# Patient Record
Sex: FEMALE | Race: WHITE | NOT HISPANIC OR LATINO
[De-identification: names, ages, dates, MRNs, and addresses within clinical notes are randomized per-mention and may not be internally consistent; named-entity substitution may affect disease eponyms.]

---

## 2021-07-09 ENCOUNTER — NON-APPOINTMENT (OUTPATIENT)
Age: 66
End: 2021-07-09

## 2021-07-09 ENCOUNTER — APPOINTMENT (OUTPATIENT)
Dept: OPHTHALMOLOGY | Facility: CLINIC | Age: 66
End: 2021-07-09
Payer: MEDICARE

## 2021-07-09 PROCEDURE — 92012 INTRM OPH EXAM EST PATIENT: CPT

## 2021-07-09 PROCEDURE — 76514 ECHO EXAM OF EYE THICKNESS: CPT

## 2021-07-16 ENCOUNTER — OUTPATIENT (OUTPATIENT)
Dept: OUTPATIENT SERVICES | Facility: HOSPITAL | Age: 66
LOS: 1 days | End: 2021-07-16
Payer: MEDICARE

## 2021-07-16 PROCEDURE — 93010 ELECTROCARDIOGRAM REPORT: CPT

## 2021-08-04 ENCOUNTER — INPATIENT (INPATIENT)
Facility: HOSPITAL | Age: 66
LOS: 1 days | Discharge: HOME HEALTH | End: 2021-08-06

## 2021-08-04 ENCOUNTER — OUTPATIENT (OUTPATIENT)
Dept: OUTPATIENT SERVICES | Facility: HOSPITAL | Age: 66
LOS: 1 days | End: 2021-08-04

## 2021-08-05 ENCOUNTER — OUTPATIENT (OUTPATIENT)
Dept: OUTPATIENT SERVICES | Facility: HOSPITAL | Age: 66
LOS: 1 days | End: 2021-08-05

## 2021-10-11 ENCOUNTER — APPOINTMENT (OUTPATIENT)
Dept: PLASTIC SURGERY | Facility: CLINIC | Age: 66
End: 2021-10-11
Payer: MEDICARE

## 2021-10-11 ENCOUNTER — NON-APPOINTMENT (OUTPATIENT)
Age: 66
End: 2021-10-11

## 2021-10-11 VITALS
HEIGHT: 63 IN | SYSTOLIC BLOOD PRESSURE: 130 MMHG | WEIGHT: 149 LBS | HEART RATE: 68 BPM | DIASTOLIC BLOOD PRESSURE: 90 MMHG | BODY MASS INDEX: 26.4 KG/M2 | TEMPERATURE: 98 F | RESPIRATION RATE: 14 BRPM | OXYGEN SATURATION: 100 %

## 2021-10-11 DIAGNOSIS — Z79.899 OTHER LONG TERM (CURRENT) DRUG THERAPY: ICD-10-CM

## 2021-10-11 DIAGNOSIS — Z98.890 OTHER SPECIFIED POSTPROCEDURAL STATES: ICD-10-CM

## 2021-10-11 DIAGNOSIS — S71.001A UNSPECIFIED OPEN WOUND, RIGHT HIP, INITIAL ENCOUNTER: ICD-10-CM

## 2021-10-11 DIAGNOSIS — E78.49 OTHER HYPERLIPIDEMIA: ICD-10-CM

## 2021-10-11 DIAGNOSIS — Z78.9 OTHER SPECIFIED HEALTH STATUS: ICD-10-CM

## 2021-10-11 DIAGNOSIS — Z82.49 FAMILY HISTORY OF ISCHEMIC HEART DISEASE AND OTHER DISEASES OF THE CIRCULATORY SYSTEM: ICD-10-CM

## 2021-10-11 DIAGNOSIS — Z80.0 FAMILY HISTORY OF MALIGNANT NEOPLASM OF DIGESTIVE ORGANS: ICD-10-CM

## 2021-10-11 DIAGNOSIS — F12.90 CANNABIS USE, UNSPECIFIED, UNCOMPLICATED: ICD-10-CM

## 2021-10-11 DIAGNOSIS — Z78.9 OTHER LONG TERM (CURRENT) DRUG THERAPY: ICD-10-CM

## 2021-10-11 DIAGNOSIS — Z80.3 FAMILY HISTORY OF MALIGNANT NEOPLASM OF BREAST: ICD-10-CM

## 2021-10-11 DIAGNOSIS — I10 ESSENTIAL (PRIMARY) HYPERTENSION: ICD-10-CM

## 2021-10-11 PROCEDURE — 99202 OFFICE O/P NEW SF 15 MIN: CPT

## 2021-10-11 PROCEDURE — 10061 I&D ABSCESS COMP/MULTIPLE: CPT

## 2021-10-11 RX ORDER — HYDROCHLOROTHIAZIDE 12.5 MG/1
12.5 CAPSULE ORAL DAILY
Refills: 0 | Status: ACTIVE | COMMUNITY

## 2021-10-11 RX ORDER — ENALAPRIL MALEATE 10 MG/1
10 TABLET ORAL
Qty: 90 | Refills: 0 | Status: ACTIVE | COMMUNITY
Start: 2021-09-21

## 2021-10-11 NOTE — HISTORY OF PRESENT ILLNESS
[FreeTextEntry1] : This is a 67 yo woman who underwent right hip replacement on 8/4/21.\par Postoperatively she has done well, gradually improving function and walking a mile and a half, but has had problems with the wound. She reports that it began to open up about 2 weeks after surgery and has had some areas of inflamation around it. She was started on a wound VAC by Dr. Alvarado and more recently has been using 2x2 gauze packing, overlying gauze and tape daily or BID. She has been showering and letting the water run over the area, but not scrubbing it.\par No fevers or chills. No hip pain (gradually improving since surgery).

## 2021-10-11 NOTE — REVIEW OF SYSTEMS
[As Noted in HPI] : as noted in HPI [Negative] : Respiratory [Fever] : no fever [Chills] : no chills [FreeTextEntry3] : Being treated for increased pressure, but not diagnosed with glaucoma and not symptomatic.

## 2021-10-11 NOTE — ASSESSMENT
[FreeTextEntry1] : Pt instructed to continue local wound care.\par We will call her to arrange for follow up next week.

## 2021-10-11 NOTE — PHYSICAL EXAM
[NI] : Normal [de-identified] : NL respiratory effort noted  [de-identified] : Right hip/right groin anterior hip replacement scar.\par There are three areas of concern within the scar. Superiorly, there is some erythema and surrounding induration. It is mildly tender and minimally fluctuant. Centrally, there is some erythema and surrounding induration with 0.5 x 3 cm fluctuant area deep to the scar. Inferiorly, there is an open wound, about 2x3 cm in maximum dimension and about 0.5cm deep. The base of the wound is made up of granulation tissue. The surrounding area is not as erythematous or indurated as the other two areas.\par There is no evidence of sinus track or deeper extension.

## 2021-10-11 NOTE — PROCEDURE
[Nl] : None [FreeTextEntry1] : Right hip open wound, right hip collection [FreeTextEntry2] : Incision and drainage of right hip wound [FreeTextEntry6] : The right hip wound was prepped with an alcohol swab and infiltrated with 1% lidocaine with epi in the periwound skin. Time was allowed to pass for vasoconstriction.\par A  1-2 cm incision was made with a 15 blade scalpel. in each area of fluctuance.  This was carried through the dermis into the subcutaneous tissue. \par A culture swab was taken in the central area.\par There was no gross purulence.\par There was indurated tissue consistent with a firm area of fat necrosis in both areas.\par Both were packed with the corner of a 4x4 gauze and dressed with bordered gauze.\par \par Pt instructed to continue local wound care.\par We will call her to arrange for follow up next week.\par

## 2021-10-20 ENCOUNTER — APPOINTMENT (OUTPATIENT)
Dept: PLASTIC SURGERY | Facility: CLINIC | Age: 66
End: 2021-10-20
Payer: MEDICARE

## 2021-10-20 VITALS
HEIGHT: 63 IN | SYSTOLIC BLOOD PRESSURE: 134 MMHG | HEART RATE: 68 BPM | TEMPERATURE: 97.9 F | DIASTOLIC BLOOD PRESSURE: 72 MMHG | BODY MASS INDEX: 26.22 KG/M2 | RESPIRATION RATE: 16 BRPM | OXYGEN SATURATION: 98 % | WEIGHT: 148 LBS

## 2021-10-20 PROCEDURE — 99213 OFFICE O/P EST LOW 20 MIN: CPT

## 2021-10-20 NOTE — HISTORY OF PRESENT ILLNESS
[FreeTextEntry1] : Pt reports she is just using a dry dressing on the area.\par Trying to change where the tape goes.\par Overall feeling better but still sore.

## 2021-10-20 NOTE — PHYSICAL EXAM
[NI] : Normal [de-identified] : NL respiratory effort noted  [de-identified] : Right hip wounds:\par The distal wound is closing nicely, about 1 x 0.3 cm now, but some dry skin and crust around the wound.\par The middle wound is very small, but has a droplet of fat necrosis coming out.\par The top wound is small with no purulence or fat necrosis.\par The surrounding tissues are much softer with no erythema and minimal induration.\par Tape marks seen bruce medially and superior to the wound.

## 2021-10-20 NOTE — REASON FOR VISIT
[Follow-Up: _____] : a [unfilled] follow-up visit [FreeTextEntry1] : the pt is here for a follow up for her right hip incision, states that she is doing better but still has some soreness

## 2021-11-03 ENCOUNTER — APPOINTMENT (OUTPATIENT)
Dept: PLASTIC SURGERY | Facility: CLINIC | Age: 66
End: 2021-11-03
Payer: MEDICARE

## 2021-11-03 VITALS
HEIGHT: 63 IN | WEIGHT: 150 LBS | BODY MASS INDEX: 26.58 KG/M2 | HEART RATE: 68 BPM | RESPIRATION RATE: 16 BRPM | DIASTOLIC BLOOD PRESSURE: 68 MMHG | OXYGEN SATURATION: 96 % | TEMPERATURE: 97.9 F | SYSTOLIC BLOOD PRESSURE: 132 MMHG

## 2021-11-03 PROCEDURE — 99212 OFFICE O/P EST SF 10 MIN: CPT

## 2021-11-03 NOTE — ASSESSMENT
[FreeTextEntry1] : Right hip wound, improving.\par I expect multiple areas to drain small amounts as the fat necrosis gradually clears.\par Continue to keep the area moist and not let it dry out as this will trap fat necrosis and potentially cause new abscesses.\par Follow up in 2-3 weeks.\par \par Pt asked about medical honey dressings. I would not advise using these at this time. When she is ready for scar care, silicone gel is recommended.

## 2021-11-03 NOTE — HISTORY OF PRESENT ILLNESS
[FreeTextEntry1] : Pt reports she has still been having some drainage, but small amounts.\par She is using the vaseline on gauze or Telfa with paper tape daily.\par The area is less sore.

## 2021-11-03 NOTE — PHYSICAL EXAM
[NI] : Normal [de-identified] : NL respiratory effort noted  [de-identified] : Right hip incision:\par The incision is mostly closed except for 5 areas that are about 3-5 mm pustules with some remaining fat necrosis.\par No surrounding erythema  and decreasing induration throughout.\par No sinus tracks.

## 2021-11-03 NOTE — REASON FOR VISIT
[Follow-Up: _____] : a [unfilled] follow-up visit [FreeTextEntry1] : here to follow up for right hip incision, states that the area is doing well and has no new complaints

## 2021-11-15 LAB — BACTERIA WND CULT: ABNORMAL

## 2021-11-18 ENCOUNTER — NON-APPOINTMENT (OUTPATIENT)
Age: 66
End: 2021-11-18

## 2021-11-24 ENCOUNTER — APPOINTMENT (OUTPATIENT)
Dept: PLASTIC SURGERY | Facility: CLINIC | Age: 66
End: 2021-11-24
Payer: MEDICARE

## 2021-11-24 VITALS
TEMPERATURE: 98.1 F | HEIGHT: 63 IN | HEART RATE: 59 BPM | BODY MASS INDEX: 25.87 KG/M2 | OXYGEN SATURATION: 96 % | SYSTOLIC BLOOD PRESSURE: 128 MMHG | WEIGHT: 146 LBS | DIASTOLIC BLOOD PRESSURE: 74 MMHG | RESPIRATION RATE: 16 BRPM

## 2021-11-24 PROCEDURE — 99213 OFFICE O/P EST LOW 20 MIN: CPT

## 2021-11-24 NOTE — PHYSICAL EXAM
[NI] : Normal [de-identified] : NL respiratory effort noted  [de-identified] : right hip scar with a few pinpoint areas open\par The majority of the wounds have healed and there is significant reduction in the surrounding induration.\par There is no erythema.\par There is no sinus track.

## 2021-11-24 NOTE — REASON FOR VISIT
[Follow-Up: _____] : a [unfilled] follow-up visit [FreeTextEntry1] : the pt is here to follow up for wound on her right hip, states that there are no new complaints and that she is doing well

## 2021-11-24 NOTE — ASSESSMENT
[FreeTextEntry1] : Doing well\par Continue wound care.\par Reassured pt that this is a slow process, but everything is improving with each visit.\par Once all of the necrotic fat is either processed by the body or pushed out through the skin, the scar will  completely close and start softening.\par Follow up in 3 weeks.

## 2021-11-24 NOTE — HISTORY OF PRESENT ILLNESS
[FreeTextEntry1] : Pt reports that she is continuing to use ointment and gauze daily.\par She does still have some hip pain.\par The "pimples" seem to go away, and then new areas open up.

## 2021-12-10 ENCOUNTER — APPOINTMENT (OUTPATIENT)
Dept: OPHTHALMOLOGY | Facility: CLINIC | Age: 66
End: 2021-12-10

## 2021-12-15 ENCOUNTER — APPOINTMENT (OUTPATIENT)
Dept: PLASTIC SURGERY | Facility: CLINIC | Age: 66
End: 2021-12-15
Payer: MEDICARE

## 2021-12-15 VITALS
SYSTOLIC BLOOD PRESSURE: 126 MMHG | DIASTOLIC BLOOD PRESSURE: 74 MMHG | BODY MASS INDEX: 26.22 KG/M2 | RESPIRATION RATE: 16 BRPM | WEIGHT: 148 LBS | HEART RATE: 68 BPM | OXYGEN SATURATION: 98 % | HEIGHT: 63 IN

## 2021-12-15 PROCEDURE — 99213 OFFICE O/P EST LOW 20 MIN: CPT

## 2021-12-16 NOTE — ASSESSMENT
[FreeTextEntry1] : Right hip wounds are getting smaller and are much less indurated/inflamed.\par Continue abx ointment or vaseline on the dressing.\par I advised avoiding tape as much as possible as the tape is starting to cause some skin trauma.\par Use tights, "boy shorts", Spanx or bike shorts to hold gauze in place instead.\par Follow up one month

## 2021-12-16 NOTE — PHYSICAL EXAM
[NI] : Normal [de-identified] : NL respiratory effort noted  [de-identified] : right hip scar with a few pinpoint areas open\par The majority of the wounds have healed and there is significant reduction in the surrounding induration.\par There is no erythema.\par There is no sinus track.

## 2021-12-16 NOTE — HISTORY OF PRESENT ILLNESS
[FreeTextEntry1] : Pt reports that she is continuing to use ointment and gauze daily.\par She does still have some hip pain.\par The "pimples" seem to go away, and then new areas open up.\par No new changes.

## 2021-12-16 NOTE — REASON FOR VISIT
[Follow-Up: _____] : a [unfilled] follow-up visit [FreeTextEntry1] : the pt is here to follow up for the right hip wound, pt states that she is doing well and has no new complaints

## 2022-01-11 ENCOUNTER — NON-APPOINTMENT (OUTPATIENT)
Age: 67
End: 2022-01-11

## 2022-01-19 ENCOUNTER — APPOINTMENT (OUTPATIENT)
Dept: PLASTIC SURGERY | Facility: CLINIC | Age: 67
End: 2022-01-19
Payer: COMMERCIAL

## 2022-01-19 VITALS
OXYGEN SATURATION: 97 % | HEART RATE: 76 BPM | SYSTOLIC BLOOD PRESSURE: 116 MMHG | HEIGHT: 63 IN | BODY MASS INDEX: 26.4 KG/M2 | TEMPERATURE: 97.8 F | RESPIRATION RATE: 16 BRPM | WEIGHT: 149 LBS | DIASTOLIC BLOOD PRESSURE: 88 MMHG

## 2022-01-19 DIAGNOSIS — R21 RASH AND OTHER NONSPECIFIC SKIN ERUPTION: ICD-10-CM

## 2022-01-19 PROCEDURE — 99213 OFFICE O/P EST LOW 20 MIN: CPT

## 2022-01-20 PROBLEM — R21 RASH OF BODY: Status: ACTIVE | Noted: 2022-01-20

## 2022-01-20 NOTE — HISTORY OF PRESENT ILLNESS
[FreeTextEntry1] : Pt reports she has been using Tinactin on the red spots on the skin near her scar. She reports they have improved and are more dry.\par She has been using vaseline and has had minimal drainage on the dressings.

## 2022-01-20 NOTE — PHYSICAL EXAM
[NI] : Normal [de-identified] : Right anterior hip/groin:\par Most of the scar is getting softer and starting to fade.\par There is one 2mm area with a droplet of fat necrosis. There is another 2 mm area superior to this that is pink and \par may have just recently closed. \par The surrounding tissues are much softer.\par There are two plaque-like red patches about 1.5cm each medially and laterally, corresponding to prior tape excoriation sites.

## 2022-01-20 NOTE — ASSESSMENT
[FreeTextEntry1] : Right hip wound, gradually improving, but now with new rash.\par Continue the antifungal powder and keep all dressings and tape off of the rash.\par Continue vaseline on the small open spot and dress only this area.\par Follow up 3-4 weeks.\par \par If rash does not resolve, may need derm input.

## 2022-01-20 NOTE — REASON FOR VISIT
[FreeTextEntry1] : The pt is here to F/U for the wound on her right hip,states that it is healing well and has no new complaints

## 2022-03-02 ENCOUNTER — APPOINTMENT (OUTPATIENT)
Dept: PLASTIC SURGERY | Facility: CLINIC | Age: 67
End: 2022-03-02
Payer: MEDICARE

## 2022-03-02 VITALS
HEIGHT: 63 IN | OXYGEN SATURATION: 98 % | HEART RATE: 66 BPM | WEIGHT: 142 LBS | RESPIRATION RATE: 16 BRPM | BODY MASS INDEX: 25.16 KG/M2 | TEMPERATURE: 98 F | SYSTOLIC BLOOD PRESSURE: 138 MMHG | DIASTOLIC BLOOD PRESSURE: 78 MMHG

## 2022-03-02 DIAGNOSIS — S71.001D UNSPECIFIED OPEN WOUND, RIGHT HIP, SUBSEQUENT ENCOUNTER: ICD-10-CM

## 2022-03-02 PROCEDURE — 99212 OFFICE O/P EST SF 10 MIN: CPT

## 2022-03-02 NOTE — PHYSICAL EXAM
[NI] : Normal [de-identified] : NL respiratory effort noted  [de-identified] : Right hip scar is essentially healed, much softer and paler.\par There are two 1-2 mm white areas that seem to be just scaly dry skin.\par No surrounding erythema.\par Rash has nearly resolved.

## 2022-03-02 NOTE — ASSESSMENT
[FreeTextEntry1] : Right hip wound has essentially healed.\par Once the dry flakes have resolved, she can start scar care. For now, just moisturize.\par I reviewed scar care as follows:\par \par Scars tend to heal fairly quickly then mature slowly over time. The vast majority of improvement occurs in the first 2-3 months, with the pink/red scar becoming softer, more pliable and lighter in color (depending upon skin tone). At 6 months, the scar appears lighter and at approximately one year, the scar reaches maximum improvement (with a range from 9-18 months).\par Scar care should include skin moisturizer, sunscreen, and if desired, silicone gel. \par Silicone gel use should begin after all scabs and crust are gone and the scars are smooth and dry, usually about 3 weeks. The silicone gel should be used twice a day for a treatment course of three months to achieve benefit. Sunscreen and/or makeup can be applied after the silicone gel has been applied and allowed to dry.\par If necessary, scar revision can be performed after 6 months (even better to wait 9-12 months). This can significantly improve some scars, but requires going through the healing and maturing process again from the beginning. \par \par Pt also noted that she had lumpectomy in the past and had thought about getting some type of reconstruction for the asymmetry. She was put off from this as she did not want any implants. Her daughter had mastectomy as well, and had her implants exchanged because of concerns regarding the risk of lymphoma. We briefly discussed that there are other methods of achieving symmetry without implants. She reports she would like to hear more, and will schedule a consultation for that.

## 2022-03-02 NOTE — HISTORY OF PRESENT ILLNESS
[FreeTextEntry1] : Pt reports she is gradually getting better.\par No more drainage from the scar.\par Still some dry skin from the rash around it.

## 2022-04-18 ENCOUNTER — APPOINTMENT (OUTPATIENT)
Dept: OPHTHALMOLOGY | Facility: CLINIC | Age: 67
End: 2022-04-18
Payer: MEDICARE

## 2022-04-18 ENCOUNTER — NON-APPOINTMENT (OUTPATIENT)
Age: 67
End: 2022-04-18

## 2022-04-18 PROCEDURE — 92133 CPTRZD OPH DX IMG PST SGM ON: CPT

## 2022-04-18 PROCEDURE — 92083 EXTENDED VISUAL FIELD XM: CPT

## 2022-04-20 ENCOUNTER — APPOINTMENT (OUTPATIENT)
Dept: PLASTIC SURGERY | Facility: CLINIC | Age: 67
End: 2022-04-20
Payer: MEDICARE

## 2022-04-20 VITALS
OXYGEN SATURATION: 99 % | TEMPERATURE: 98 F | DIASTOLIC BLOOD PRESSURE: 80 MMHG | WEIGHT: 144 LBS | RESPIRATION RATE: 16 BRPM | BODY MASS INDEX: 25.52 KG/M2 | HEIGHT: 63 IN | SYSTOLIC BLOOD PRESSURE: 128 MMHG | HEART RATE: 59 BPM

## 2022-04-20 PROCEDURE — 99215 OFFICE O/P EST HI 40 MIN: CPT

## 2022-04-26 ENCOUNTER — NON-APPOINTMENT (OUTPATIENT)
Age: 67
End: 2022-04-26

## 2022-04-26 ENCOUNTER — APPOINTMENT (OUTPATIENT)
Dept: OPHTHALMOLOGY | Facility: CLINIC | Age: 67
End: 2022-04-26
Payer: MEDICARE

## 2022-04-26 PROCEDURE — 92014 COMPRE OPH EXAM EST PT 1/>: CPT

## 2022-04-30 NOTE — PHYSICAL EXAM
[NI] : Normal [de-identified] : NL respiratory effort noted  [de-identified] : Right breast larger with grade 2-3 ptosis.\par Left breast smaller with restricted and tethered lower pole.\par This tethering is bringing the 6 o'clock aspect of the areola down toward the IMF.\par Central lower pole tissue deficit.

## 2022-04-30 NOTE — HISTORY OF PRESENT ILLNESS
[FreeTextEntry1] : This is a 66 yo woman who I have been following for her right hip open wound who now presents for consultation regarding breast reconstruction.\par Regarding the hip, she reports that she is improving significantly, the scar is softening and has become flatter without any new areas opening up.\par Regarding the breasts, she underwent left breast lumpectomy and sentinel lymph node biopsy in 2005, followed by radiation therapy. She reports her surgeon was Dr. Grewal, but she does not have any reports and that his office has not kept the records from that long ago.\par She was not offered any reconstruction at that time. She was initially not bothered by it as she was more worried about the cancer, but recently she has been more disturbed by the scarring and asymmetry that was created by the combination of surgery and radiation.

## 2022-04-30 NOTE — REASON FOR VISIT
[Follow-Up: _____] : a [unfilled] follow-up visit [FreeTextEntry1] : The pt is here to discuss her breast reconstruction.

## 2022-04-30 NOTE — ASSESSMENT
[FreeTextEntry1] : Breast scarring and asymmetry following left partial mastectomy and radiation.\par I believe that significant improvement in symmetry as well as in shape of the left breast can be achieved with revision of the left breast (excision of tethered scar, bringing the breast tissue together in the lower pole and raising the NAC) and right breast reduction for symmetry and well as fat grafting to address the left lower pole deficit.\par \par We reviewed the R/B/A of the procedure, including, but not limited to, the risks of decreased sensation or loss of sensation of the NAC, partial or complete NAC necrosis, wound healing problems requiring wound care, especially at the T-junction points of the incisions. I went over the different scar shapes and their positions on the breasts. \par We also reviewed risks of surgery in general (bleeding or hematoma requiring a return to the operating room, and infection). \par We also went over the risks, benefits and alternatives to fat grafting, including, but not limited to fat necrosis resembling a breast mass and oil cysts. We also reviewed the soreness and bruising expected at the fat harvest (liposuction) sites. We reviewed the possibility of contour deformity at the harvest sites. I also discussed the timeline for fat graft take, and that it will take 2-3 months to see how much of the fat will live in its new location. Additional sessions of fat grafting may be required.\par She was given an opportunity to ask questions, and all of her questions were answered. She wishes to proceed. \par \par

## 2022-04-30 NOTE — REVIEW OF SYSTEMS
[Negative] : Respiratory [FreeTextEntry9] : Right hip is getting better after total joint replacement and wound healing issues. [de-identified] : Right hip scar no longer draining or open. Multiple patchy areas of skin that pt reports has been diagnosed as morphea.

## 2022-06-06 DIAGNOSIS — Z01.818 ENCOUNTER FOR OTHER PREPROCEDURAL EXAMINATION: ICD-10-CM

## 2022-06-17 ENCOUNTER — OUTPATIENT (OUTPATIENT)
Dept: OUTPATIENT SERVICES | Facility: HOSPITAL | Age: 67
LOS: 1 days | End: 2022-06-17
Payer: MEDICARE

## 2022-06-17 PROCEDURE — 93010 ELECTROCARDIOGRAM REPORT: CPT

## 2022-06-24 ENCOUNTER — APPOINTMENT (OUTPATIENT)
Dept: PLASTIC SURGERY | Facility: HOSPITAL | Age: 67
End: 2022-06-24

## 2022-06-24 ENCOUNTER — OUTPATIENT (OUTPATIENT)
Dept: OUTPATIENT SERVICES | Facility: HOSPITAL | Age: 67
LOS: 1 days | End: 2022-06-24

## 2022-06-24 DIAGNOSIS — N65.0 DEFORMITY OF RECONSTRUCTED BREAST: ICD-10-CM

## 2022-06-24 DIAGNOSIS — Z85.3 PERSONAL HISTORY OF MALIGNANT NEOPLASM OF BREAST: ICD-10-CM

## 2022-06-24 PROCEDURE — 88305 TISSUE EXAM BY PATHOLOGIST: CPT | Mod: 26

## 2022-06-25 ENCOUNTER — NON-APPOINTMENT (OUTPATIENT)
Age: 67
End: 2022-06-25

## 2022-06-25 LAB — SARS-COV-2 N GENE NPH QL NAA+PROBE: NOT DETECTED

## 2022-06-26 DIAGNOSIS — N65.1 DISPROPORTION OF RECONSTRUCTED BREAST: ICD-10-CM

## 2022-06-26 DIAGNOSIS — Z01.810 ENCOUNTER FOR PREPROCEDURAL CARDIOVASCULAR EXAMINATION: ICD-10-CM

## 2022-06-26 DIAGNOSIS — N65.0 DEFORMITY OF RECONSTRUCTED BREAST: ICD-10-CM

## 2022-06-26 DIAGNOSIS — Z85.3 PERSONAL HISTORY OF MALIGNANT NEOPLASM OF BREAST: ICD-10-CM

## 2022-06-26 DIAGNOSIS — Z01.812 ENCOUNTER FOR PREPROCEDURAL LABORATORY EXAMINATION: ICD-10-CM

## 2022-06-28 ENCOUNTER — APPOINTMENT (OUTPATIENT)
Dept: PLASTIC SURGERY | Facility: CLINIC | Age: 67
End: 2022-06-28
Payer: MEDICARE

## 2022-06-28 VITALS
HEART RATE: 64 BPM | HEIGHT: 63 IN | BODY MASS INDEX: 26.05 KG/M2 | RESPIRATION RATE: 16 BRPM | TEMPERATURE: 98.1 F | OXYGEN SATURATION: 98 % | WEIGHT: 147 LBS | SYSTOLIC BLOOD PRESSURE: 169 MMHG | DIASTOLIC BLOOD PRESSURE: 75 MMHG

## 2022-06-28 PROCEDURE — 99024 POSTOP FOLLOW-UP VISIT: CPT

## 2022-06-28 RX ORDER — ENALAPRIL MALEATE 10 MG/1
10 TABLET ORAL DAILY
Refills: 0 | Status: DISCONTINUED | COMMUNITY
End: 2022-06-28

## 2022-06-28 NOTE — HISTORY OF PRESENT ILLNESS
[FreeTextEntry1] : Pt reports she is feeling well and the pain is manageable. Mostly just tingling and soreness.\par Used alternating tylenol and ibuprofen, no narcotics.\par Said she wanted to bring her  today to meet me.

## 2022-06-28 NOTE — REASON FOR VISIT
[Follow-Up: _____] : a [unfilled] follow-up visit [Spouse] : spouse [FreeTextEntry1] : Patient states she feels good but has tingling in both breasts and bruising on her stomach. She also stated she last took Tylenol at 10 pm last night.

## 2022-06-28 NOTE — ASSESSMENT
[FreeTextEntry1] : Doing well postop.\par Trim or remove steristrips when they start to fall off.\par Use bra or camisole to hold bandages in place until they are dry for two days in a row. Bra is optional after that.\par Follow up in two weeks.\par Call for any questions or concerns.

## 2022-06-28 NOTE — PHYSICAL EXAM
[NI] : Normal [de-identified] : NL respiratory effort noted  [de-identified] : bilateral breasts with edema and ecchymosis as expected.\par Incisions clean and dry. Steristrips in place.\par NACs viable and sensate (left with decreased sensation compared to the left). [de-identified] : Upper abdominal ecchymosis.\par Lower abdomen suture sites with no erythema.

## 2022-06-30 ENCOUNTER — NON-APPOINTMENT (OUTPATIENT)
Age: 67
End: 2022-06-30

## 2022-07-18 ENCOUNTER — NON-APPOINTMENT (OUTPATIENT)
Age: 67
End: 2022-07-18

## 2022-07-18 ENCOUNTER — EMERGENCY (EMERGENCY)
Facility: HOSPITAL | Age: 67
LOS: 1 days | Discharge: HOME | End: 2022-07-18
Admitting: EMERGENCY MEDICINE

## 2022-07-18 DIAGNOSIS — K57.32 DIVERTICULITIS OF LARGE INTESTINE WITHOUT PERFORATION OR ABSCESS WITHOUT BLEEDING: ICD-10-CM

## 2022-07-18 DIAGNOSIS — I10 ESSENTIAL (PRIMARY) HYPERTENSION: ICD-10-CM

## 2022-07-18 DIAGNOSIS — R10.32 LEFT LOWER QUADRANT PAIN: ICD-10-CM

## 2022-07-18 DIAGNOSIS — F17.200 NICOTINE DEPENDENCE, UNSPECIFIED, UNCOMPLICATED: ICD-10-CM

## 2022-07-18 DIAGNOSIS — E87.6 HYPOKALEMIA: ICD-10-CM

## 2022-07-18 PROCEDURE — 74176 CT ABD & PELVIS W/O CONTRAST: CPT | Mod: 26,MA

## 2022-07-18 PROCEDURE — 99285 EMERGENCY DEPT VISIT HI MDM: CPT

## 2022-07-18 PROCEDURE — 71045 X-RAY EXAM CHEST 1 VIEW: CPT | Mod: 26

## 2022-07-20 ENCOUNTER — APPOINTMENT (OUTPATIENT)
Dept: PLASTIC SURGERY | Facility: CLINIC | Age: 67
End: 2022-07-20

## 2022-07-20 VITALS
HEART RATE: 55 BPM | BODY MASS INDEX: 25.16 KG/M2 | RESPIRATION RATE: 16 BRPM | TEMPERATURE: 98.2 F | OXYGEN SATURATION: 98 % | SYSTOLIC BLOOD PRESSURE: 110 MMHG | HEIGHT: 63 IN | WEIGHT: 142 LBS | DIASTOLIC BLOOD PRESSURE: 70 MMHG

## 2022-07-20 PROCEDURE — 99024 POSTOP FOLLOW-UP VISIT: CPT

## 2022-07-20 RX ORDER — SODIUM SULFATE, MAGNESIUM SULFATE, AND POTASSIUM CHLORIDE 17.75; 2.7; 2.25 G/1; G/1; G/1
1479-225-188 TABLET ORAL
Qty: 24 | Refills: 0 | Status: COMPLETED | COMMUNITY
Start: 2022-01-28

## 2022-07-20 RX ORDER — AMOXICILLIN 500 MG/1
500 CAPSULE ORAL
Qty: 16 | Refills: 0 | Status: COMPLETED | COMMUNITY
Start: 2022-05-19

## 2022-07-20 RX ORDER — METRONIDAZOLE 500 MG/1
500 TABLET ORAL
Qty: 40 | Refills: 0 | Status: ACTIVE | COMMUNITY
Start: 2022-07-18

## 2022-07-20 RX ORDER — CEPHALEXIN 500 MG/1
500 CAPSULE ORAL
Qty: 4 | Refills: 0 | Status: COMPLETED | COMMUNITY
Start: 2022-05-31

## 2022-07-20 NOTE — ASSESSMENT
[FreeTextEntry1] : Doing well postop, but now being treated for an acute episode of diverticulitis.\par Continue treatment and GI follow up.\par Follow up with me in one month.\par Call for any questions or concerns.

## 2022-07-20 NOTE — PHYSICAL EXAM
[NI] : Normal [de-identified] : NL respiratory effort noted  [de-identified] : Bilateral breasts healing very well.\par Sterstrips removed.\par No open areas.\par Skin is pink, but not red, especially on the left.\par Not particularly warm or tender.\par No drainage. [de-identified] : Mild distention\par Moderate tenderness to palpation of the left lower quadrant.\par Incisions well healed.\par Ecchymosis resolved.\par No erythema, No collection.

## 2022-07-20 NOTE — REASON FOR VISIT
[Follow-Up: _____] : a [unfilled] follow-up visit [FreeTextEntry1] : Patient states tenderness on both breasts and is nervous to take off steri strips.

## 2022-07-20 NOTE — HISTORY OF PRESENT ILLNESS
[FreeTextEntry1] : Pt reports she is taking Cipro and Flagyl for the diverticulitis.\par She did have some diarrhea, but it calmed down with the flagyl.\par She still has tenderness in the left lower quadrant.\par She brought in a copy of her last colonoscopy report and it did show diverticulosis.\par \par She reports she is very happy with her breasts, but she was nervous to take the steristrips off.

## 2022-07-21 ENCOUNTER — TRANSCRIPTION ENCOUNTER (OUTPATIENT)
Age: 67
End: 2022-07-21

## 2022-08-24 ENCOUNTER — APPOINTMENT (OUTPATIENT)
Dept: PLASTIC SURGERY | Facility: CLINIC | Age: 67
End: 2022-08-24

## 2022-08-24 VITALS
RESPIRATION RATE: 16 BRPM | SYSTOLIC BLOOD PRESSURE: 120 MMHG | OXYGEN SATURATION: 99 % | DIASTOLIC BLOOD PRESSURE: 80 MMHG | TEMPERATURE: 98.3 F | BODY MASS INDEX: 24.8 KG/M2 | HEIGHT: 63 IN | HEART RATE: 64 BPM | WEIGHT: 140 LBS

## 2022-08-24 PROCEDURE — 99024 POSTOP FOLLOW-UP VISIT: CPT

## 2022-08-24 RX ORDER — CHROMIUM 200 MCG
TABLET ORAL
Refills: 0 | Status: DISCONTINUED | COMMUNITY
End: 2022-08-24

## 2022-08-24 RX ORDER — CIPROFLOXACIN HYDROCHLORIDE 500 MG/1
500 TABLET, FILM COATED ORAL
Qty: 20 | Refills: 0 | Status: DISCONTINUED | COMMUNITY
Start: 2022-07-18 | End: 2022-08-24

## 2022-08-30 NOTE — ASSESSMENT
[FreeTextEntry1] : Doing well postop.\par Radiation changes on the left are causing firmness and fibrosis. She should start gently massaging the left lower pole to try to get this area to soften. We discussed that this is not predictable, and may or may not improve.\par She reported her understanding, and is grateful for the improvement that she does have.\par Follow up in three months.\par Call for any questions or concerns.

## 2022-08-30 NOTE — PHYSICAL EXAM
[NI] : Normal [de-identified] : NL respiratory effort noted  [de-identified] : Bilateral breasts are well healed.\par The left side remains firmer and higher while the right side is settling nicely.\par The inferior incision on the left is very firm and fibrotic.

## 2022-08-30 NOTE — HISTORY OF PRESENT ILLNESS
[FreeTextEntry1] : Pt reports she is feeling well overall, but notices that the left NAC, which was lower initially, is higher now.

## 2022-10-25 ENCOUNTER — NON-APPOINTMENT (OUTPATIENT)
Age: 67
End: 2022-10-25

## 2022-10-25 ENCOUNTER — APPOINTMENT (OUTPATIENT)
Dept: OPHTHALMOLOGY | Facility: CLINIC | Age: 67
End: 2022-10-25

## 2022-10-25 PROCEDURE — 92014 COMPRE OPH EXAM EST PT 1/>: CPT

## 2022-11-16 ENCOUNTER — APPOINTMENT (OUTPATIENT)
Dept: PLASTIC SURGERY | Facility: CLINIC | Age: 67
End: 2022-11-16

## 2022-11-16 VITALS
TEMPERATURE: 98 F | BODY MASS INDEX: 25.16 KG/M2 | HEART RATE: 65 BPM | OXYGEN SATURATION: 98 % | HEIGHT: 63 IN | DIASTOLIC BLOOD PRESSURE: 78 MMHG | WEIGHT: 142 LBS | SYSTOLIC BLOOD PRESSURE: 120 MMHG | RESPIRATION RATE: 16 BRPM

## 2022-11-16 DIAGNOSIS — N65.0 DEFORMITY OF RECONSTRUCTED BREAST: ICD-10-CM

## 2022-11-16 DIAGNOSIS — Z85.3 PERSONAL HISTORY OF MALIGNANT NEOPLASM OF BREAST: ICD-10-CM

## 2022-11-16 DIAGNOSIS — N65.1 DISPROPORTION OF RECONSTRUCTED BREAST: ICD-10-CM

## 2022-11-16 PROCEDURE — 99213 OFFICE O/P EST LOW 20 MIN: CPT

## 2022-11-16 RX ORDER — LATANOPROST/PF 0.005 %
0.01 DROPS OPHTHALMIC (EYE)
Qty: 2 | Refills: 0 | Status: ACTIVE | COMMUNITY
Start: 2022-10-25

## 2022-11-16 RX ORDER — TIMOLOL MALEATE 5 MG/ML
0.5 SOLUTION OPHTHALMIC TWICE DAILY
Refills: 0 | Status: DISCONTINUED | COMMUNITY
End: 2022-11-16

## 2022-11-17 PROBLEM — N65.0 DEFORMITY OF RECONSTRUCTED BREAST: Status: ACTIVE | Noted: 2022-04-30

## 2022-11-17 PROBLEM — Z85.3 HISTORY OF MALIGNANT NEOPLASM OF BREAST: Status: RESOLVED | Noted: 2021-10-11 | Resolved: 2022-11-17

## 2022-11-17 PROBLEM — N65.1 DISPROPORTION OF RECONSTRUCTED BREAST: Status: ACTIVE | Noted: 2022-04-30

## 2022-11-17 NOTE — REASON FOR VISIT
[Follow-Up: _____] : a [unfilled] follow-up visit [FreeTextEntry1] : Patient states she is doing well and has no complaints.

## 2022-11-17 NOTE — PHYSICAL EXAM
[NI] : Normal [de-identified] : NL respiratory effort noted  [de-identified] : Bilateral breasts are well healed.\par The left side remains firmer and higher, but softer than the last visit.\par The right side is well healed and soft.\par The inferior incision on the left is very firm and fibrotic.

## 2022-11-17 NOTE — HISTORY OF PRESENT ILLNESS
[FreeTextEntry1] : Doing well. Wants to know when she can have her mammogram.\par She does feel the left side looks nicer on the top, but she is happy with her results overall.\par Both sides are getting softer.

## 2022-11-29 ENCOUNTER — APPOINTMENT (OUTPATIENT)
Dept: OPHTHALMOLOGY | Facility: CLINIC | Age: 67
End: 2022-11-29

## 2022-11-29 ENCOUNTER — NON-APPOINTMENT (OUTPATIENT)
Age: 67
End: 2022-11-29

## 2022-11-29 PROCEDURE — 99213 OFFICE O/P EST LOW 20 MIN: CPT

## 2022-12-05 ENCOUNTER — APPOINTMENT (OUTPATIENT)
Dept: OPHTHALMOLOGY | Facility: CLINIC | Age: 67
End: 2022-12-05

## 2022-12-05 ENCOUNTER — NON-APPOINTMENT (OUTPATIENT)
Age: 67
End: 2022-12-05

## 2022-12-05 PROCEDURE — 99213 OFFICE O/P EST LOW 20 MIN: CPT

## 2022-12-05 PROCEDURE — 92136 OPHTHALMIC BIOMETRY: CPT

## 2022-12-14 ENCOUNTER — NON-APPOINTMENT (OUTPATIENT)
Age: 67
End: 2022-12-14

## 2022-12-14 ENCOUNTER — APPOINTMENT (OUTPATIENT)
Dept: OPHTHALMOLOGY | Facility: AMBULATORY MEDICAL SERVICES | Age: 67
End: 2022-12-14

## 2022-12-14 PROCEDURE — 66984 XCAPSL CTRC RMVL W/O ECP: CPT | Mod: LT

## 2022-12-15 ENCOUNTER — NON-APPOINTMENT (OUTPATIENT)
Age: 67
End: 2022-12-15

## 2022-12-15 ENCOUNTER — APPOINTMENT (OUTPATIENT)
Dept: OPHTHALMOLOGY | Facility: CLINIC | Age: 67
End: 2022-12-15

## 2022-12-15 PROCEDURE — 99024 POSTOP FOLLOW-UP VISIT: CPT

## 2022-12-22 ENCOUNTER — APPOINTMENT (OUTPATIENT)
Dept: OPHTHALMOLOGY | Facility: CLINIC | Age: 67
End: 2022-12-22

## 2022-12-22 ENCOUNTER — NON-APPOINTMENT (OUTPATIENT)
Age: 67
End: 2022-12-22

## 2022-12-22 PROCEDURE — 92136 OPHTHALMIC BIOMETRY: CPT | Mod: 26,RT

## 2022-12-22 PROCEDURE — 99213 OFFICE O/P EST LOW 20 MIN: CPT | Mod: 24

## 2023-01-03 ENCOUNTER — APPOINTMENT (OUTPATIENT)
Dept: OPHTHALMOLOGY | Facility: CLINIC | Age: 68
End: 2023-01-03
Payer: MEDICARE

## 2023-01-03 ENCOUNTER — NON-APPOINTMENT (OUTPATIENT)
Age: 68
End: 2023-01-03

## 2023-01-03 PROCEDURE — 99024 POSTOP FOLLOW-UP VISIT: CPT

## 2023-01-06 ENCOUNTER — NON-APPOINTMENT (OUTPATIENT)
Age: 68
End: 2023-01-06

## 2023-01-06 ENCOUNTER — APPOINTMENT (OUTPATIENT)
Dept: OPHTHALMOLOGY | Facility: CLINIC | Age: 68
End: 2023-01-06
Payer: MEDICARE

## 2023-01-06 PROCEDURE — 99024 POSTOP FOLLOW-UP VISIT: CPT

## 2023-01-11 ENCOUNTER — NON-APPOINTMENT (OUTPATIENT)
Age: 68
End: 2023-01-11

## 2023-01-11 ENCOUNTER — APPOINTMENT (OUTPATIENT)
Dept: OPHTHALMOLOGY | Facility: AMBULATORY MEDICAL SERVICES | Age: 68
End: 2023-01-11
Payer: MEDICARE

## 2023-01-11 PROCEDURE — 66984 XCAPSL CTRC RMVL W/O ECP: CPT | Mod: 79,RT

## 2023-01-12 ENCOUNTER — NON-APPOINTMENT (OUTPATIENT)
Age: 68
End: 2023-01-12

## 2023-01-12 ENCOUNTER — APPOINTMENT (OUTPATIENT)
Dept: OPHTHALMOLOGY | Facility: CLINIC | Age: 68
End: 2023-01-12
Payer: MEDICARE

## 2023-01-12 PROCEDURE — 99024 POSTOP FOLLOW-UP VISIT: CPT

## 2023-01-18 ENCOUNTER — APPOINTMENT (OUTPATIENT)
Dept: PLASTIC SURGERY | Facility: CLINIC | Age: 68
End: 2023-01-18
Payer: MEDICARE

## 2023-01-18 VITALS
BODY MASS INDEX: 25.69 KG/M2 | HEIGHT: 63 IN | HEART RATE: 61 BPM | SYSTOLIC BLOOD PRESSURE: 120 MMHG | RESPIRATION RATE: 16 BRPM | WEIGHT: 145 LBS | DIASTOLIC BLOOD PRESSURE: 70 MMHG | OXYGEN SATURATION: 98 % | TEMPERATURE: 97.6 F

## 2023-01-18 DIAGNOSIS — L59.8 OTHER SPECIFIED DISORDERS OF THE SKIN AND SUBCUTANEOUS TISSUE RELATED TO RADIATION: ICD-10-CM

## 2023-01-18 DIAGNOSIS — L90.5 SCAR CONDITIONS AND FIBROSIS OF SKIN: ICD-10-CM

## 2023-01-18 PROCEDURE — 99213 OFFICE O/P EST LOW 20 MIN: CPT

## 2023-01-18 NOTE — REASON FOR VISIT
[Follow-Up: _____] : a [unfilled] follow-up visit [FreeTextEntry1] : Patient states she feels good and has no complaints.

## 2023-01-18 NOTE — HISTORY OF PRESENT ILLNESS
[FreeTextEntry1] : Pt feeling well.\par No interval changes except for bilateral cataracts done (left 12/14/22 and right 1/11/23. Left is problematic in terms of her vision, but the right one is good).\par asking if it is OK to get her mammogram yet.

## 2023-01-18 NOTE — PHYSICAL EXAM
[NI] : Normal [de-identified] : NL respiratory effort noted  [de-identified] : Bilateral breasts are well healed.\par The left side remains firmer and higher, but the lower pole is softer than the last visit.\par The right side is well healed and soft.\par The inferior scar on the left is much softer than previous.

## 2023-01-18 NOTE — ASSESSMENT
[FreeTextEntry1] : Doing well about 6 months after breast reconstruction for deformity after lumpectomy and radiation on the left.\par Results continue to improve over time.\par Continue silicone gel.\par OK to proceed with mammogram.\par Follow up in June (one year since surgery).

## 2023-01-20 ENCOUNTER — APPOINTMENT (OUTPATIENT)
Dept: OPHTHALMOLOGY | Facility: CLINIC | Age: 68
End: 2023-01-20
Payer: MEDICARE

## 2023-01-20 ENCOUNTER — NON-APPOINTMENT (OUTPATIENT)
Age: 68
End: 2023-01-20

## 2023-01-20 PROCEDURE — 68761 CLOSE TEAR DUCT OPENING: CPT | Mod: 79,LT

## 2023-01-20 PROCEDURE — 99024 POSTOP FOLLOW-UP VISIT: CPT

## 2023-02-03 ENCOUNTER — NON-APPOINTMENT (OUTPATIENT)
Age: 68
End: 2023-02-03

## 2023-02-03 ENCOUNTER — APPOINTMENT (OUTPATIENT)
Dept: OPHTHALMOLOGY | Facility: CLINIC | Age: 68
End: 2023-02-03
Payer: MEDICARE

## 2023-02-03 PROCEDURE — 99024 POSTOP FOLLOW-UP VISIT: CPT

## 2023-02-10 ENCOUNTER — NON-APPOINTMENT (OUTPATIENT)
Age: 68
End: 2023-02-10

## 2023-02-10 ENCOUNTER — APPOINTMENT (OUTPATIENT)
Dept: OPHTHALMOLOGY | Facility: CLINIC | Age: 68
End: 2023-02-10
Payer: MEDICARE

## 2023-02-10 PROCEDURE — 99024 POSTOP FOLLOW-UP VISIT: CPT

## 2023-03-10 ENCOUNTER — NON-APPOINTMENT (OUTPATIENT)
Age: 68
End: 2023-03-10

## 2023-03-10 ENCOUNTER — APPOINTMENT (OUTPATIENT)
Dept: OPHTHALMOLOGY | Facility: CLINIC | Age: 68
End: 2023-03-10
Payer: MEDICARE

## 2023-03-10 PROCEDURE — 99024 POSTOP FOLLOW-UP VISIT: CPT

## 2023-06-05 ENCOUNTER — NON-APPOINTMENT (OUTPATIENT)
Age: 68
End: 2023-06-05

## 2023-06-07 ENCOUNTER — APPOINTMENT (OUTPATIENT)
Dept: PLASTIC SURGERY | Facility: CLINIC | Age: 68
End: 2023-06-07
Payer: MEDICARE

## 2023-06-07 VITALS
DIASTOLIC BLOOD PRESSURE: 70 MMHG | HEART RATE: 66 BPM | OXYGEN SATURATION: 98 % | HEIGHT: 63 IN | WEIGHT: 145 LBS | SYSTOLIC BLOOD PRESSURE: 120 MMHG | RESPIRATION RATE: 16 BRPM | TEMPERATURE: 97.3 F | BODY MASS INDEX: 25.69 KG/M2

## 2023-06-07 DIAGNOSIS — Z09 ENCOUNTER FOR FOLLOW-UP EXAMINATION AFTER COMPLETED TREATMENT FOR CONDITIONS OTHER THAN MALIGNANT NEOPLASM: ICD-10-CM

## 2023-06-07 PROCEDURE — 99213 OFFICE O/P EST LOW 20 MIN: CPT

## 2023-06-07 NOTE — ADDENDUM
[FreeTextEntry1] : All medical record entries were at my direction and personally dictated by me (Dr. Hailee Randall). I have reviewed the chart and agree that the record accurately reflects my personal performance of the history, physical exam, assessment and plan.\par  \par Follow up as needed.\par Call for any questions or concerns.

## 2023-06-07 NOTE — PHYSICAL EXAM
[de-identified] : Left breast is still a bit firmer, but overall the tissues are relaxing and are softer.  the incisions are healing well and she continues to use the silicone on them all.

## 2023-06-07 NOTE — ASSESSMENT
[FreeTextEntry1] : 69 y/o female for follow up after breast reconstruction for deformity after lumpectomy and radiation of left breast \par \par Healing well\par Mammogram WNL as per patient \par Results much improved \par Patient to follow up in the future as needed \par plan and patient status discussed with Dr Randall

## 2023-06-07 NOTE — HISTORY OF PRESENT ILLNESS
[FreeTextEntry1] : " Im feeling great"\par \par Patient presents for follow up and states that she thinks the left breast is healing well and has no complaints. She states that she had her mammogram since her last appointment and the results were normal.

## 2023-06-07 NOTE — REASON FOR VISIT
[Follow-Up: _____] : a [unfilled] follow-up visit [FreeTextEntry1] : Patient states she feels great and has no issues to report.

## 2023-06-14 ENCOUNTER — APPOINTMENT (OUTPATIENT)
Dept: OPHTHALMOLOGY | Facility: CLINIC | Age: 68
End: 2023-06-14
Payer: MEDICARE

## 2023-06-14 ENCOUNTER — NON-APPOINTMENT (OUTPATIENT)
Age: 68
End: 2023-06-14

## 2023-06-14 PROCEDURE — 92014 COMPRE OPH EXAM EST PT 1/>: CPT

## 2023-07-31 ENCOUNTER — APPOINTMENT (OUTPATIENT)
Dept: OPHTHALMOLOGY | Facility: CLINIC | Age: 68
End: 2023-07-31
Payer: MEDICARE

## 2023-07-31 ENCOUNTER — NON-APPOINTMENT (OUTPATIENT)
Age: 68
End: 2023-07-31

## 2023-07-31 PROCEDURE — 92133 CPTRZD OPH DX IMG PST SGM ON: CPT

## 2023-07-31 PROCEDURE — 92012 INTRM OPH EXAM EST PATIENT: CPT

## 2023-07-31 PROCEDURE — 92083 EXTENDED VISUAL FIELD XM: CPT

## 2023-07-31 NOTE — ASSESSMENT
[FreeTextEntry1] : Doing well postop.\par Left, radiated breast is still firmer than the right, but improving.\par I would recommend waiting a few more months prior to getting the mammogram in the hopes that the left breast would be a bit softer.\par Follow up in January to reassess.
None known

## 2023-09-15 ASSESSMENT — HOOS JR
WALKING ON UNEVEN SURFACE: MODERATE
GOING UP OR DOWN STAIRS: MODERATE
GOING UP OR DOWN STAIRS: MILD
IMPORTED HOOS JR SCORE: 67.52
SITTING: MILD
IMPORTED LATERALITY: RIGHT
HOOS JR RAW SCORE: 7
LYING IN BED (TURNING OVER, MAINTAINING HIP POSITION): MODERATE
LYING IN BED (TURNING OVER, MAINTAINING HIP POSITION): MILD
WALKING ON UNEVEN SURFACE: MILD
RISING FROM SITTING: MILD
BENDING TO THE FLOOR TO PICK UP OBJECT: MILD
IMPORTED FORM: YES

## 2023-12-01 ENCOUNTER — APPOINTMENT (OUTPATIENT)
Dept: OPHTHALMOLOGY | Facility: CLINIC | Age: 68
End: 2023-12-01
Payer: MEDICARE

## 2023-12-01 ENCOUNTER — NON-APPOINTMENT (OUTPATIENT)
Age: 68
End: 2023-12-01

## 2023-12-01 PROCEDURE — 92014 COMPRE OPH EXAM EST PT 1/>: CPT

## 2023-12-01 PROCEDURE — 92250 FUNDUS PHOTOGRAPHY W/I&R: CPT

## 2024-06-07 ENCOUNTER — NON-APPOINTMENT (OUTPATIENT)
Age: 69
End: 2024-06-07

## 2024-06-07 ENCOUNTER — APPOINTMENT (OUTPATIENT)
Dept: OPHTHALMOLOGY | Facility: CLINIC | Age: 69
End: 2024-06-07
Payer: MEDICARE

## 2024-06-07 PROCEDURE — 99213 OFFICE O/P EST LOW 20 MIN: CPT

## 2024-09-06 ENCOUNTER — NON-APPOINTMENT (OUTPATIENT)
Age: 69
End: 2024-09-06

## 2024-09-06 ENCOUNTER — APPOINTMENT (OUTPATIENT)
Dept: OPHTHALMOLOGY | Facility: CLINIC | Age: 69
End: 2024-09-06
Payer: MEDICARE

## 2024-09-06 PROCEDURE — 92133 CPTRZD OPH DX IMG PST SGM ON: CPT

## 2024-09-06 PROCEDURE — 92014 COMPRE OPH EXAM EST PT 1/>: CPT

## 2024-11-01 ENCOUNTER — APPOINTMENT (OUTPATIENT)
Dept: OPHTHALMOLOGY | Facility: CLINIC | Age: 69
End: 2024-11-01
Payer: MEDICARE

## 2024-11-01 ENCOUNTER — NON-APPOINTMENT (OUTPATIENT)
Age: 69
End: 2024-11-01

## 2024-11-01 PROCEDURE — 99213 OFFICE O/P EST LOW 20 MIN: CPT

## 2024-11-01 PROCEDURE — 92133 CPTRZD OPH DX IMG PST SGM ON: CPT

## 2025-03-17 ENCOUNTER — NON-APPOINTMENT (OUTPATIENT)
Age: 70
End: 2025-03-17

## 2025-03-17 ENCOUNTER — APPOINTMENT (OUTPATIENT)
Dept: OPHTHALMOLOGY | Facility: CLINIC | Age: 70
End: 2025-03-17
Payer: MEDICARE

## 2025-03-17 PROCEDURE — 92083 EXTENDED VISUAL FIELD XM: CPT

## 2025-03-28 ENCOUNTER — APPOINTMENT (OUTPATIENT)
Dept: OPHTHALMOLOGY | Facility: CLINIC | Age: 70
End: 2025-03-28

## 2025-03-28 ENCOUNTER — NON-APPOINTMENT (OUTPATIENT)
Age: 70
End: 2025-03-28

## 2025-03-28 PROCEDURE — 92012 INTRM OPH EXAM EST PATIENT: CPT

## 2025-04-15 ENCOUNTER — NON-APPOINTMENT (OUTPATIENT)
Age: 70
End: 2025-04-15

## 2025-04-17 ENCOUNTER — NON-APPOINTMENT (OUTPATIENT)
Age: 70
End: 2025-04-17

## 2025-04-17 ENCOUNTER — APPOINTMENT (OUTPATIENT)
Dept: OPHTHALMOLOGY | Facility: CLINIC | Age: 70
End: 2025-04-17
Payer: MEDICARE

## 2025-04-17 PROCEDURE — 99213 OFFICE O/P EST LOW 20 MIN: CPT

## 2025-05-02 ENCOUNTER — NON-APPOINTMENT (OUTPATIENT)
Age: 70
End: 2025-05-02

## 2025-05-02 ENCOUNTER — APPOINTMENT (OUTPATIENT)
Dept: OPHTHALMOLOGY | Facility: CLINIC | Age: 70
End: 2025-05-02
Payer: MEDICARE

## 2025-05-02 PROCEDURE — 92012 INTRM OPH EXAM EST PATIENT: CPT

## 2025-06-27 ENCOUNTER — APPOINTMENT (OUTPATIENT)
Dept: OPHTHALMOLOGY | Facility: CLINIC | Age: 70
End: 2025-06-27
Payer: MEDICARE

## 2025-06-27 ENCOUNTER — NON-APPOINTMENT (OUTPATIENT)
Age: 70
End: 2025-06-27

## 2025-06-27 PROCEDURE — 99213 OFFICE O/P EST LOW 20 MIN: CPT
